# Patient Record
Sex: MALE | Race: WHITE | NOT HISPANIC OR LATINO | URBAN - METROPOLITAN AREA
[De-identification: names, ages, dates, MRNs, and addresses within clinical notes are randomized per-mention and may not be internally consistent; named-entity substitution may affect disease eponyms.]

---

## 2017-08-09 ENCOUNTER — EMERGENCY (EMERGENCY)
Facility: HOSPITAL | Age: 37
LOS: 1 days | Discharge: HOME | End: 2017-08-09
Attending: EMERGENCY MEDICINE | Admitting: EMERGENCY MEDICINE
Payer: SELF-PAY

## 2017-08-09 VITALS
SYSTOLIC BLOOD PRESSURE: 98 MMHG | HEART RATE: 99 BPM | TEMPERATURE: 98 F | OXYGEN SATURATION: 97 % | RESPIRATION RATE: 20 BRPM | DIASTOLIC BLOOD PRESSURE: 69 MMHG

## 2017-08-09 VITALS
RESPIRATION RATE: 17 BRPM | SYSTOLIC BLOOD PRESSURE: 100 MMHG | HEART RATE: 89 BPM | DIASTOLIC BLOOD PRESSURE: 68 MMHG | OXYGEN SATURATION: 96 %

## 2017-08-09 DIAGNOSIS — R41.82 ALTERED MENTAL STATUS, UNSPECIFIED: ICD-10-CM

## 2017-08-09 DIAGNOSIS — F10.129 ALCOHOL ABUSE WITH INTOXICATION, UNSPECIFIED: ICD-10-CM

## 2017-08-09 PROCEDURE — 99053 MED SERV 10PM-8AM 24 HR FAC: CPT

## 2017-08-09 PROCEDURE — 99283 EMERGENCY DEPT VISIT LOW MDM: CPT | Mod: 25

## 2017-08-09 NOTE — ED ADULT TRIAGE NOTE - CHIEF COMPLAINT QUOTE
BIBA for altered mental status. Admits to drinking etoh tonight. Denies any illicit drug use. No obvious injury noted. Patient calm and cooperative, answering questions. In no acute distress.

## 2017-08-09 NOTE — ED PROVIDER NOTE - OBJECTIVE STATEMENT
patient arrives with aob and intoxicated, poor historian, unable to walk per ems, found intoxicated.

## 2017-08-09 NOTE — ED PROVIDER NOTE - UNABLE TO OBTAIN
Urgent need for Intervention patient arrives with aob and intoxicated, poor historian, unable to walk per ems, found intoxicated.

## 2017-08-09 NOTE — ED ADULT NURSE NOTE - CHPI ED SYMPTOMS NEG
no confusion/no vomiting/no fever/no abdominal distension/no weakness/no nausea/no chills/no abdominal pain/no pain/no disorientation

## 2019-02-21 ENCOUNTER — EMERGENCY (EMERGENCY)
Facility: HOSPITAL | Age: 39
LOS: 1 days | Discharge: HOME | End: 2019-02-21
Attending: EMERGENCY MEDICINE | Admitting: EMERGENCY MEDICINE
Payer: MEDICAID

## 2019-02-21 VITALS
TEMPERATURE: 98 F | HEART RATE: 107 BPM | OXYGEN SATURATION: 93 % | SYSTOLIC BLOOD PRESSURE: 123 MMHG | DIASTOLIC BLOOD PRESSURE: 75 MMHG | RESPIRATION RATE: 17 BRPM

## 2019-02-21 DIAGNOSIS — R41.82 ALTERED MENTAL STATUS, UNSPECIFIED: ICD-10-CM

## 2019-02-21 DIAGNOSIS — F10.129 ALCOHOL ABUSE WITH INTOXICATION, UNSPECIFIED: ICD-10-CM

## 2019-02-21 DIAGNOSIS — F11.129 OPIOID ABUSE WITH INTOXICATION, UNSPECIFIED: ICD-10-CM

## 2019-02-21 PROCEDURE — 99284 EMERGENCY DEPT VISIT MOD MDM: CPT | Mod: 25

## 2019-02-21 RX ORDER — NALOXONE HYDROCHLORIDE 4 MG/.1ML
1.2 SPRAY NASAL ONCE
Qty: 0 | Refills: 0 | Status: COMPLETED | OUTPATIENT
Start: 2019-02-21 | End: 2019-02-21
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
## 2019-02-21 NOTE — ED ADULT NURSE NOTE - OBJECTIVE STATEMENT
pt arrived for AMS, given narcan in the field. On arrival, pt was medically examined by Rn and MD. While trying to give narcan and stabilizing airway, pt woke up and was refusing medication. pt attempted to kick out MD. pt verbally agitated, security and [employer] called to bedside. Pt discharged.

## 2019-02-21 NOTE — ED ADULT TRIAGE NOTE - CHIEF COMPLAINT QUOTE
BIBA, picked up at [facility], for altered mental status. Pt noted with pinpoint pupils  in the field. Given 2mg of narcan intranasally. On arrival, pt responsive to sternal rub. +AOB

## 2019-02-21 NOTE — ED PROVIDER NOTE - PHYSICAL EXAMINATION
CON: pt appears to be resting comfortable and easily arousable w/ verbal stimuli, HENMT: clear oropharynx, soft neck, no pooling of secretion, HEAD: atraumatic, CV: rrr, equal pulses b/l, RESP: cta b/l, GI: +BS, soft, nontender, no rebound, no guarding, SKIN: no rash, MSK: no deformities, NEURO: clear speech, moving all extremities, steady gait

## 2019-02-21 NOTE — ED PROVIDER NOTE - NSFOLLOWUPINSTRUCTIONS_ED_ALL_ED_FT
Follow up with your primary care doctor or clinics listed below  [facility]  [address] [place] [id number]  [facility]  Address: [address] [place] [id number]   Return immediately for any new or worsening symptoms or any new concerns

## 2019-02-21 NOTE — ED ADULT NURSE NOTE - CHIEF COMPLAINT QUOTE
BIBA, picked up at [facility], for altered mental status. Pt noted with pinpoint pupils  in the field. Given 2mg of narcan intranasally. On arrival, pt responsive to sternal rub. +AOB
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
## 2023-02-02 NOTE — ED PROVIDER NOTE - NS ED MD EM SELECTION
Report called to [facility] [address], verified tele with monitor tech. Patient and family aware of pending transfer.   [id number] [facility]

## 2024-04-16 NOTE — ED PROVIDER NOTE - NS ED MD DISPO DISCHARGE CCDA
I am not making any changes to his medications. I will bridge his medications to pain management but I will not be managing (changing) them).    Patient/Caregiver provided printed discharge information.